# Patient Record
Sex: FEMALE | URBAN - METROPOLITAN AREA
[De-identification: names, ages, dates, MRNs, and addresses within clinical notes are randomized per-mention and may not be internally consistent; named-entity substitution may affect disease eponyms.]

---
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
## 2021-02-18 ENCOUNTER — RX ONLY (RX ONLY)
Age: 21
End: 2021-02-18

## 2021-02-18 RX ORDER — TRETIONIN 0.5 MG/G
CREAM TOPICAL
Qty: 1 | Refills: 2 | Status: ERX | COMMUNITY
Start: 2021-02-18

## 2021-07-15 ENCOUNTER — RX ONLY (RX ONLY)
Age: 21
End: 2021-07-15

## 2021-07-15 RX ORDER — TRETIONIN 0.5 MG/G
CREAM TOPICAL
Qty: 1 | Refills: 2 | Status: ERX

## 2023-01-09 ENCOUNTER — APPOINTMENT (OUTPATIENT)
Dept: CARDIOLOGY | Facility: CLINIC | Age: 23
End: 2023-01-09
Attending: EMERGENCY MEDICINE
Payer: COMMERCIAL

## 2023-01-09 ENCOUNTER — HOSPITAL ENCOUNTER (EMERGENCY)
Facility: CLINIC | Age: 23
Discharge: HOME | End: 2023-01-09
Attending: EMERGENCY MEDICINE | Admitting: EMERGENCY MEDICINE
Payer: COMMERCIAL

## 2023-01-09 ENCOUNTER — APPOINTMENT (OUTPATIENT)
Dept: GENERAL RADIOLOGY | Facility: CLINIC | Age: 23
End: 2023-01-09
Attending: EMERGENCY MEDICINE
Payer: COMMERCIAL

## 2023-01-09 VITALS
HEIGHT: 67 IN | DIASTOLIC BLOOD PRESSURE: 67 MMHG | RESPIRATION RATE: 11 BRPM | HEART RATE: 91 BPM | SYSTOLIC BLOOD PRESSURE: 107 MMHG | OXYGEN SATURATION: 98 % | TEMPERATURE: 98.1 F

## 2023-01-09 DIAGNOSIS — R00.2 PALPITATIONS: ICD-10-CM

## 2023-01-09 LAB
ANION GAP SERPL CALCULATED.3IONS-SCNC: 5 MMOL/L (ref 3–14)
ATRIAL RATE - MUSE: 114 BPM
BASOPHILS # BLD AUTO: 0 10E3/UL (ref 0–0.2)
BASOPHILS NFR BLD AUTO: 0 %
BUN SERPL-MCNC: 7 MG/DL (ref 7–30)
CALCIUM SERPL-MCNC: 9.3 MG/DL (ref 8.5–10.1)
CHLORIDE BLD-SCNC: 106 MMOL/L (ref 94–109)
CO2 SERPL-SCNC: 26 MMOL/L (ref 20–32)
CREAT SERPL-MCNC: 0.73 MG/DL (ref 0.52–1.04)
D DIMER PPP FEU-MCNC: <0.27 UG/ML FEU (ref 0–0.5)
DIASTOLIC BLOOD PRESSURE - MUSE: NORMAL MMHG
EOSINOPHIL # BLD AUTO: 0.1 10E3/UL (ref 0–0.7)
EOSINOPHIL NFR BLD AUTO: 2 %
ERYTHROCYTE [DISTWIDTH] IN BLOOD BY AUTOMATED COUNT: 12.2 % (ref 10–15)
GFR SERPL CREATININE-BSD FRML MDRD: >90 ML/MIN/1.73M2
GLUCOSE BLD-MCNC: 114 MG/DL (ref 70–99)
HCG SERPL QL: NEGATIVE
HCT VFR BLD AUTO: 45.6 % (ref 35–47)
HGB BLD-MCNC: 15.4 G/DL (ref 11.7–15.7)
IMM GRANULOCYTES # BLD: 0 10E3/UL
IMM GRANULOCYTES NFR BLD: 0 %
INTERPRETATION ECG - MUSE: NORMAL
LYMPHOCYTES # BLD AUTO: 2.1 10E3/UL (ref 0.8–5.3)
LYMPHOCYTES NFR BLD AUTO: 25 %
MCH RBC QN AUTO: 31.5 PG (ref 26.5–33)
MCHC RBC AUTO-ENTMCNC: 33.8 G/DL (ref 31.5–36.5)
MCV RBC AUTO: 93 FL (ref 78–100)
MONOCYTES # BLD AUTO: 0.6 10E3/UL (ref 0–1.3)
MONOCYTES NFR BLD AUTO: 7 %
NEUTROPHILS # BLD AUTO: 5.6 10E3/UL (ref 1.6–8.3)
NEUTROPHILS NFR BLD AUTO: 66 %
NRBC # BLD AUTO: 0 10E3/UL
NRBC BLD AUTO-RTO: 0 /100
P AXIS - MUSE: 74 DEGREES
PLATELET # BLD AUTO: 240 10E3/UL (ref 150–450)
POTASSIUM BLD-SCNC: 3.8 MMOL/L (ref 3.4–5.3)
PR INTERVAL - MUSE: 126 MS
QRS DURATION - MUSE: 94 MS
QT - MUSE: 336 MS
QTC - MUSE: 463 MS
R AXIS - MUSE: 54 DEGREES
RBC # BLD AUTO: 4.89 10E6/UL (ref 3.8–5.2)
SODIUM SERPL-SCNC: 137 MMOL/L (ref 133–144)
SYSTOLIC BLOOD PRESSURE - MUSE: NORMAL MMHG
T AXIS - MUSE: 52 DEGREES
TROPONIN I SERPL HS-MCNC: 5 NG/L
TSH SERPL DL<=0.005 MIU/L-ACNC: 2.5 MU/L (ref 0.4–4)
VENTRICULAR RATE- MUSE: 114 BPM
WBC # BLD AUTO: 8.5 10E3/UL (ref 4–11)

## 2023-01-09 PROCEDURE — 84484 ASSAY OF TROPONIN QUANT: CPT | Performed by: EMERGENCY MEDICINE

## 2023-01-09 PROCEDURE — 93005 ELECTROCARDIOGRAM TRACING: CPT | Mod: XU

## 2023-01-09 PROCEDURE — 71046 X-RAY EXAM CHEST 2 VIEWS: CPT

## 2023-01-09 PROCEDURE — 85048 AUTOMATED LEUKOCYTE COUNT: CPT | Performed by: EMERGENCY MEDICINE

## 2023-01-09 PROCEDURE — 85018 HEMOGLOBIN: CPT | Performed by: EMERGENCY MEDICINE

## 2023-01-09 PROCEDURE — 84443 ASSAY THYROID STIM HORMONE: CPT | Performed by: EMERGENCY MEDICINE

## 2023-01-09 PROCEDURE — 99285 EMERGENCY DEPT VISIT HI MDM: CPT | Mod: 25

## 2023-01-09 PROCEDURE — 93242 EXT ECG>48HR<7D RECORDING: CPT

## 2023-01-09 PROCEDURE — 85379 FIBRIN DEGRADATION QUANT: CPT | Performed by: EMERGENCY MEDICINE

## 2023-01-09 PROCEDURE — 80048 BASIC METABOLIC PNL TOTAL CA: CPT | Performed by: EMERGENCY MEDICINE

## 2023-01-09 PROCEDURE — 36415 COLL VENOUS BLD VENIPUNCTURE: CPT | Performed by: EMERGENCY MEDICINE

## 2023-01-09 PROCEDURE — 93244 EXT ECG>48HR<7D REV&INTERPJ: CPT | Performed by: INTERNAL MEDICINE

## 2023-01-09 PROCEDURE — 84703 CHORIONIC GONADOTROPIN ASSAY: CPT | Performed by: EMERGENCY MEDICINE

## 2023-01-09 ASSESSMENT — ACTIVITIES OF DAILY LIVING (ADL)
ADLS_ACUITY_SCORE: 35
ADLS_ACUITY_SCORE: 35

## 2023-01-09 ASSESSMENT — ENCOUNTER SYMPTOMS: SHORTNESS OF BREATH: 1

## 2023-01-09 NOTE — ED TRIAGE NOTES
Pt reports has had episodes of fast heart rate. Pt was feeling it over the past 48 hours. Pt gets very SOB when it happens. Pt states watch shows 's pt states arms start shaking and legs from knee on down get numb     Triage Assessment     Row Name 01/09/23 2796       Triage Assessment (Adult)    Airway WDL WDL       Respiratory WDL    Respiratory WDL X    Rhythm/Pattern, Respiratory shortness of breath       Skin Circulation/Temperature WDL    Skin Circulation/Temperature WDL WDL       Cardiac WDL    Cardiac WDL X;rhythm    Cardiac Rhythm ST       Peripheral/Neurovascular WDL    Peripheral Neurovascular WDL WDL       Cognitive/Neuro/Behavioral WDL    Cognitive/Neuro/Behavioral WDL WDL

## 2023-01-09 NOTE — ED PROVIDER NOTES
"    History     Chief Complaint:  Shortness of Breath and Tachycardia    The history is provided by the patient.      Hina Smith is a 22 year old female who presents with shortness of breath. Saturday afternoon her heart rate began to increase while out in a social setting, she then checked her watch as she has a history of episodes like this. She states that it didn't get as high as it had in the past but she began to develop some episodic left sided chest pain with shortness of breath. She denies any alcohol use, chance of pregnancy.    ROS:  Review of Systems   Respiratory: Positive for shortness of breath.    Cardiovascular: Positive for chest pain.   All other systems reviewed and are negative.    Allergies:  Patient has no known allergies      Medications:    Patient not taking any routine medications     Past Medical History:    Vitamin D insufficiency      Social History:   reports that she has never smoked. She does not have any smokeless tobacco history on file. She reports that she does not currently use alcohol. She reports that she does not use drugs.  PCP: No primary care provider on file.     Physical Exam     Patient Vitals for the past 24 hrs:   BP Temp Temp src Pulse Resp SpO2 Height   01/09/23 1845 -- -- -- -- -- 98 % --   01/09/23 1804 108/67 -- -- 83 -- 98 % --   01/09/23 1803 -- -- -- -- -- 98 % --   01/09/23 1730 -- -- -- -- -- 99 % --   01/09/23 1623 -- -- -- 86 11 100 % --   01/09/23 1606 -- -- -- 87 10 100 % --   01/09/23 1604 117/78 -- -- -- -- -- --   01/09/23 1557 -- -- -- 89 14 99 % --   01/09/23 1538 124/85 -- -- 101 29 98 % --   01/09/23 1447 136/79 98.1  F (36.7  C) Temporal (!) 134 20 100 % 1.702 m (5' 7\")        Physical Exam  Physical Exam   General:  Sitting on bed alone.   HENT:  No obvious trauma to head  Right Ear:  External ear normal.   Left Ear:  External ear normal.   Nose:  Nose normal.   Eyes:  Conjunctivae and EOM are normal. Pupils are equal, round, and " reactive.   Neck: Normal range of motion. Neck supple. No tracheal deviation present.   CV:  Normal heart sounds. No murmur heard.  Pulm/Chest: Effort normal and breath sounds normal.   Abd: Soft. No distension. There is no tenderness. There is no rigidity, no rebound and no guarding.   M/S: Normal range of motion. no calf pain or selling.  Neuro: Alert. GCS 15.  Skin: Skin is warm and dry. No rash noted. Not diaphoretic.   Psych: Normal mood and affect. Behavior is normal.       Emergency Department Course   ECG  ECG obtained at 1445, ECG read at 1457  Sinus tachycardia   Possible left atrial bundle branch block   Borderline ECG  Rate 114 bpm. PA interval 126 ms. QRS duration 94 ms. QT/QTc 336/463 ms. P-R-T axes 74 54 52.    Imaging:  XR Chest 2 Views   Final Result   IMPRESSION: Negative chest.      Leadless EKG Monitor 3 to 14 Days    (Results Pending)      Report per radiology    Laboratory:  Labs Ordered and Resulted from Time of ED Arrival to Time of ED Departure   BASIC METABOLIC PANEL - Abnormal       Result Value    Sodium 137      Potassium 3.8      Chloride 106      Carbon Dioxide (CO2) 26      Anion Gap 5      Urea Nitrogen 7      Creatinine 0.73      Calcium 9.3      Glucose 114 (*)     GFR Estimate >90     TROPONIN I - Normal    Troponin I High Sensitivity 5     HCG QUALITATIVE PREGNANCY - Normal    hCG Serum Qualitative Negative     TSH WITH FREE T4 REFLEX - Normal    TSH 2.50     D DIMER QUANTITATIVE - Normal    D-Dimer Quantitative <0.27     CBC WITH PLATELETS AND DIFFERENTIAL    WBC Count 8.5      RBC Count 4.89      Hemoglobin 15.4      Hematocrit 45.6      MCV 93      MCH 31.5      MCHC 33.8      RDW 12.2      Platelet Count 240      % Neutrophils 66      % Lymphocytes 25      % Monocytes 7      % Eosinophils 2      % Basophils 0      % Immature Granulocytes 0      NRBCs per 100 WBC 0      Absolute Neutrophils 5.6      Absolute Lymphocytes 2.1      Absolute Monocytes 0.6      Absolute Eosinophils  0.1      Absolute Basophils 0.0      Absolute Immature Granulocytes 0.0      Absolute NRBCs 0.0          Emergency Department Course & Assessments:         Interventions:  Medications - No data to display     Consultations/Discussion of Management or Tests:  1604 I consulted with the patient and obtained history   1855 Updated patient. Cardiac monitor did not show any dysrhythmias      Social Determinants of Health affecting care:  None     Disposition:  The patient was discharged to home.     Impression & Plan    Medical Decision Making:  Hina Smith is a very pleasant 22 year old female who presents for evaluation of shortness of breath, tachycardia.  Initial ECG shows sinus tachycardia, possible left atrial bundle branch block.  A broad differential diagnosis was considered including SVT, Atrial fibrillation, ventricular arrhythmia, thyroid disease, acute electrolyte abnormality,  drugs/medications, caffeine intake or other stimulants, medication side effect, anemia, heart disease, PE, etc. The EKG was reviewed and shows no evidence of Brugada syndrome, Monroe-Parkinson-White, hypertrophic cardiomyopathy or prolonged QTc syndrome. ddimer neg so doubt pe. The chest xray was reviewed and shows no evidence of pneumothorax, infiltrate to suggest pneumonia, widened mediastinum to suggest aortic dissection, obvious rib fracture or free air under the diaphragm to suggest perforated viscous ulcer.  Cardiac monitor did not show any dysrhythmias.  Given her history of heart rate even up to 180 without symptoms other than palpitations, discussed this may be SVT.  She agreed against continued outpatient monitoring.  Discussed reasons to return.  The workup and exam here in ED would indicate that supportive outpatient management is indicated.  I doubt PE as patient has no prior history and ddimer neg.  Doubt acute coronary syndrome given symptoms and exam.  Will have them follow up with their primary car physician.       The treatment plan was discussed with the patient and they expressed understanding of this plan and consented to the plan.  In addition, the patient will return to the emergency department if their symptoms persist, worsen, if new symptoms arise or if there is any concern as other pathology may be present that is not evident at this time. They also understand the importance of close follow up in the clinic and if unable to do so will return to the emergency department for a reevaluation. All questions were answered.    Diagnosis:    ICD-10-CM    1. Palpitations  R00.2 Leadless EKG Monitor 3 to 14 Days           Discharge Medications:  New Prescriptions    No medications on file        Scribe Disclosure:  I, Ritesh Maddox, am serving as a scribe at 3:45 PM on 1/9/2023 to document services personally performed by Evans Sorto DO based on my observations and the provider's statements to me.     1/9/2023   Evans Sorto DO Anderson, Robert James, DO  01/09/23 1905

## 2023-01-12 ENCOUNTER — OFFICE VISIT (OUTPATIENT)
Dept: FAMILY MEDICINE | Facility: CLINIC | Age: 23
End: 2023-01-12
Payer: COMMERCIAL

## 2023-01-12 VITALS
HEIGHT: 67 IN | RESPIRATION RATE: 18 BRPM | OXYGEN SATURATION: 98 % | SYSTOLIC BLOOD PRESSURE: 124 MMHG | HEART RATE: 91 BPM | DIASTOLIC BLOOD PRESSURE: 74 MMHG | WEIGHT: 201.6 LBS | TEMPERATURE: 97.7 F | BODY MASS INDEX: 31.64 KG/M2

## 2023-01-12 DIAGNOSIS — Z09 HOSPITAL DISCHARGE FOLLOW-UP: Primary | ICD-10-CM

## 2023-01-12 DIAGNOSIS — R00.2 PALPITATIONS: ICD-10-CM

## 2023-01-12 PROCEDURE — 99204 OFFICE O/P NEW MOD 45 MIN: CPT | Performed by: FAMILY MEDICINE

## 2023-01-12 ASSESSMENT — PAIN SCALES - GENERAL: PAINLEVEL: NO PAIN (0)
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
## 2023-01-23 ENCOUNTER — TELEPHONE (OUTPATIENT)
Dept: FAMILY MEDICINE | Facility: CLINIC | Age: 23
End: 2023-01-23

## 2023-01-23 NOTE — TELEPHONE ENCOUNTER
Patient called the clinic asking if results for Zio patch are in the system. She dropped it off at the nearest post office per instructions on 1/17/23 and was told that it can take up to 1-2 weeks to get results. She is to schedule a follow up appt when the results come in. Patient care team please reach out to patient when the results are in and schedule a visit.

## 2023-01-24 ENCOUNTER — VIRTUAL VISIT (OUTPATIENT)
Dept: FAMILY MEDICINE | Facility: CLINIC | Age: 23
End: 2023-01-24
Payer: COMMERCIAL

## 2023-01-24 DIAGNOSIS — R00.2 PALPITATIONS: Primary | ICD-10-CM

## 2023-01-24 PROCEDURE — 99214 OFFICE O/P EST MOD 30 MIN: CPT | Mod: GT | Performed by: FAMILY MEDICINE

## 2023-01-24 NOTE — PROGRESS NOTES
"Hina is a 22 year old who is being evaluated via a billable video visit.      How would you like to obtain your AVS? MyChart  If the video visit is dropped, the invitation should be resent by: Text to cell phone: [phone]  Will anyone else be joining your video visit? No          Assessment & Plan     Palpitations    asymptomatic currently.  Discussed results of   ziopath -  and it  showed \" mostly normal sinus rhythm. There was no atrial fibrillation or any unusual abnormal rhythms,except  rare PVC/ PAC-but it was  less than 1% and  it  did not correlate with her sx     Reassured pt , that this can happen with  normal healthy hearts -. So likely these were benign and not concerning    So ok to watch and  keep up a healthy active lifestyle.  I had any excessive caffeine, OTC decongestant alcohol etc..  Clinically she is doing better and has had not felt any symptoms.  She also wears an apple watch and she can certainly monitor her heart and that as well.   If any ongoing or recurrent symptoms , then reminded her  to do follow up if needed   I have added some info on my-chart to  Read as well   Cares and concerns  discussed follow up if problem       Brie Horowitz MD  [facility]    Karin Santamaria is a 22 year old, presenting for the following health issues:  No chief complaint on file.      History of Present Illness       Reason for visit:  Review and discuss the Zio Patch results.    She eats 2-3 servings of fruits and vegetables daily.She consumes 0 sweetened beverage(s) daily.She exercises with enough effort to increase her heart rate 30 to 60 minutes per day.  She exercises with enough effort to increase her heart rate 4 days per week.   She is taking medications regularly.       Here to do follow resolved , no sx currently since last visit , breathing is also good   Had jesus after being evaluated at the ER for episode of racing heart rate, some feelings of " lightheaded and shortness of breath etc.    Cardiac work-up including EKG was normal, cardiac enzymes D-dimer etc. were all negative.   Completed Zio patch just recently and here to discuss the results.  Clinically he think he is feeling much better she has not had any episodes of palpitations shortness of breath dizziness etc. since the last visit.   He has been feeling fine.  She has reduced her caffeine use, and even after the coffee she states she does not feel any symptoms.   There is no family history of any heart issues or arrhythmias.  Denies any stress, anxiety or mood changes.  No history of alcohol use any drug abuse   she is physically quite active.  still doing her regular exercise routine without any problem.    She does not feel any symptoms of chest pain, palpitation, shortness of breath, or lightheadedness during exercise.       Evaluated at the ER    Review of Systems   Constitutional, HEENT, cardiovascular, pulmonary, GI, , musculoskeletal, neuro, skin, endocrine and psych systems are negative, except as otherwise noted.      Objective           Vitals:  No vitals were obtained today due to virtual visit.    Physical Exam   GENERAL: Healthy, alert and no distress  EYES: Eyes grossly normal to inspection.  No discharge or erythema, or obvious scleral/conjunctival abnormalities.  RESP: No audible wheeze, cough, or visible cyanosis.  No visible retractions or increased work of breathing.    SKIN: Visible given a Zio patch clear. No significant rash, abnormal pigmentation or lesions.  NEURO: Cranial nerves grossly intact.  Mentation and speech appropriate for age.  PSYCH: Mentation appears normal, affect normal/bright, judgement and insight intact, normal speech and appearance well-groomed.        Video-Visit Details    Type of service:  Video Visit  Time spent 22 min     Originating Location (pt. Location): Home    Distant Location (provider location):  On-site  Platform used for Video Visit:  AmWell

## 2023-01-25 NOTE — PATIENT INSTRUCTIONS
Your heart monitor showed mostly normal sinus rhythm which is a normal heartbeat.  There was no atrial fibrillation or any unusual abnormal rhythms.  As I mentioned to you there was-  rare PVC/ PAC,the premature beats-but it was  less than 1%  This can happen I normal healthy hearts - and I your case they did not correlate with your sx . So likely these were benign and not concerning   I have added some info for you to read,  just for your understanding and it might be helpful. So ok to watch and  keep up a healthy active lifestyle.   If any ongoing or recurrent symptoms , then make sure to do follow up if needed

## 2023-04-05 ENCOUNTER — TELEPHONE (OUTPATIENT)
Dept: FAMILY MEDICINE | Facility: CLINIC | Age: 23
End: 2023-04-05
Payer: COMMERCIAL

## 2023-04-05 NOTE — TELEPHONE ENCOUNTER
Patient Quality Outreach    Patient is due for the following:   Cervical Cancer Screening - PAP Needed  Physical Preventive Adult Physical    Next Steps:   Schedule a Adult Preventative    Type of outreach:    Sent [employer] message.      Questions for provider review:    None     Shannon Roberts, [facility]

## 2023-04-15 ENCOUNTER — HEALTH MAINTENANCE LETTER (OUTPATIENT)
Age: 23
End: 2023-04-15

## 2024-06-16 ENCOUNTER — HEALTH MAINTENANCE LETTER (OUTPATIENT)
Age: 24
End: 2024-06-16

## 2025-06-21 ENCOUNTER — HEALTH MAINTENANCE LETTER (OUTPATIENT)
Age: 25
End: 2025-06-21